# Patient Record
Sex: FEMALE | Race: WHITE | URBAN - METROPOLITAN AREA
[De-identification: names, ages, dates, MRNs, and addresses within clinical notes are randomized per-mention and may not be internally consistent; named-entity substitution may affect disease eponyms.]

---

## 2020-12-15 PROBLEM — M89.9 LESION OF BONE OF LEFT SHOULDER: Status: ACTIVE | Noted: 2020-12-15

## 2021-01-12 PROBLEM — M75.00 FROZEN SHOULDER: Status: ACTIVE | Noted: 2021-01-12

## 2021-09-22 ENCOUNTER — E-VISIT (OUTPATIENT)
Dept: PRIMARY CARE CLINIC | Age: 26
End: 2021-09-22

## 2021-09-22 DIAGNOSIS — B96.89 ACUTE BACTERIAL SINUSITIS: Primary | ICD-10-CM

## 2021-09-22 DIAGNOSIS — J01.90 ACUTE BACTERIAL SINUSITIS: Primary | ICD-10-CM

## 2021-09-22 PROCEDURE — 99423 OL DIG E/M SVC 21+ MIN: CPT | Performed by: INTERNAL MEDICINE

## 2021-09-23 ASSESSMENT — LIFESTYLE VARIABLES: SMOKING_STATUS: NO, I'VE NEVER SMOKED

## 2021-09-24 RX ORDER — AMOXICILLIN AND CLAVULANATE POTASSIUM 875; 125 MG/1; MG/1
1 TABLET, FILM COATED ORAL 2 TIMES DAILY
Qty: 20 TABLET | Refills: 0 | Status: SHIPPED | OUTPATIENT
Start: 2021-09-24 | End: 2021-10-04

## 2021-09-24 RX ORDER — FLUTICASONE PROPIONATE 50 MCG
2 SPRAY, SUSPENSION (ML) NASAL DAILY
Qty: 16 G | Refills: 0 | Status: SHIPPED | OUTPATIENT
Start: 2021-09-24 | End: 2021-11-11 | Stop reason: ALTCHOICE

## 2021-09-24 NOTE — PROGRESS NOTES
You appear to have a sinus infection. Please finish the Augmentin antibiotic prescription. Flonase nasal spray may help with your nasal congestion. Continue your current therapy for fever control. Tylenol may be alternated with ibuprofen. Follow up with your PCP if your symptoms continue. 21 + minutes were spent on this E visit.

## 2021-09-27 PROBLEM — M54.12 RADICULITIS OF LEFT CERVICAL REGION: Status: ACTIVE | Noted: 2021-09-27
